# Patient Record
Sex: MALE | Race: WHITE | URBAN - METROPOLITAN AREA
[De-identification: names, ages, dates, MRNs, and addresses within clinical notes are randomized per-mention and may not be internally consistent; named-entity substitution may affect disease eponyms.]

---

## 2022-07-07 ENCOUNTER — WEB ENCOUNTER (OUTPATIENT)
Dept: URBAN - METROPOLITAN AREA CLINIC 72 | Facility: CLINIC | Age: 77
End: 2022-07-07

## 2022-07-13 ENCOUNTER — OFFICE VISIT (OUTPATIENT)
Dept: URBAN - METROPOLITAN AREA CLINIC 72 | Facility: CLINIC | Age: 77
End: 2022-07-13
Payer: MEDICARE

## 2022-07-13 ENCOUNTER — LAB OUTSIDE AN ENCOUNTER (OUTPATIENT)
Dept: URBAN - METROPOLITAN AREA CLINIC 72 | Facility: CLINIC | Age: 77
End: 2022-07-13

## 2022-07-13 VITALS
TEMPERATURE: 97.6 F | BODY MASS INDEX: 27.94 KG/M2 | HEART RATE: 80 BPM | DIASTOLIC BLOOD PRESSURE: 68 MMHG | HEIGHT: 70 IN | WEIGHT: 195.2 LBS | SYSTOLIC BLOOD PRESSURE: 139 MMHG

## 2022-07-13 DIAGNOSIS — Z98.890 STATUS POST LAPAROSCOPIC FUNDOPLICATION: ICD-10-CM

## 2022-07-13 DIAGNOSIS — K22.70 BARRETT'S ESOPHAGUS WITHOUT DYSPLASIA: ICD-10-CM

## 2022-07-13 DIAGNOSIS — K21.9 GASTROESOPHAGEAL REFLUX DISEASE, UNSPECIFIED WHETHER ESOPHAGITIS PRESENT: ICD-10-CM

## 2022-07-13 DIAGNOSIS — R13.19 ESOPHAGEAL DYSPHAGIA: ICD-10-CM

## 2022-07-13 PROCEDURE — 99204 OFFICE O/P NEW MOD 45 MIN: CPT | Performed by: INTERNAL MEDICINE

## 2022-07-13 RX ORDER — AMLODIPINE BESYLATE 10 MG/1
1 TABLET TABLET ORAL ONCE A DAY
Status: ACTIVE | COMMUNITY

## 2022-07-13 RX ORDER — ROSUVASTATIN CALCIUM 5 MG/1
1 TABLET TABLET, FILM COATED ORAL ONCE A DAY
Status: ACTIVE | COMMUNITY

## 2022-07-13 RX ORDER — BUPROPION HYDROCHLORIDE 150 MG/1
1 TABLET IN THE MORNING TABLET, FILM COATED ORAL ONCE A DAY
Status: ACTIVE | COMMUNITY

## 2022-07-13 RX ORDER — ASCORBIC ACID/ASCORBATE SODIUM 500 MG
1 TABLET TABLET,CHEWABLE ORAL ONCE A DAY
Status: ACTIVE | COMMUNITY

## 2022-07-13 RX ORDER — FLUOXETINE HYDROCHLORIDE 20 MG/1
1 CAPSULE CAPSULE ORAL ONCE A DAY
Status: ACTIVE | COMMUNITY

## 2022-07-13 RX ORDER — CHOLECALCIFEROL (VITAMIN D3) 50 MCG
1 CAPSULE CAPSULE ORAL ONCE A DAY
Status: ACTIVE | COMMUNITY

## 2022-07-13 RX ORDER — TIOTROPIUM BROMIDE AND OLODATEROL 3.124; 2.736 UG/1; UG/1
2 PUFFS SPRAY, METERED RESPIRATORY (INHALATION) ONCE A DAY
Status: ACTIVE | COMMUNITY

## 2022-07-13 RX ORDER — LISINOPRIL 40 MG/1
1 TABLET TABLET ORAL ONCE A DAY
Status: ACTIVE | COMMUNITY

## 2022-07-13 NOTE — HPI-TODAY'S VISIT:
Mr. Daily is a 76-year-old who presents as a new patient for consultation for Ivy's.  He has a history of GERD, Ivy's, status post cholecystectomy and hiatal hernia.  He was seen by [facility] in the past for evaluation for large hiatal hernia.  To the medical history includes hypertension, coronary artery disease status post stent in the 1990s, he is noted to be on Plavix in the past. He is no longer on ppi.   He underwent a da Agusto repair of a hiatal hernia at [facility] in 2021.His last upper endoscopy was prior to surgery.  He reports that he needs Ivy's surveillance in addition he is having difficulty swallowing and dysphagia.  He notes that he is no longer short of breath as he was prior to surgery but he does get some discomfort at times.  He notes that he has trouble swallowing mostly to solids happens intermittently.  Denies any heartburn.  He has required ablation of Ivy's in the past.

## 2022-07-21 PROBLEM — 40890009 ESOPHAGEAL DYSPHAGIA: Status: ACTIVE | Noted: 2022-07-21

## 2022-08-11 ENCOUNTER — OFFICE VISIT (OUTPATIENT)
Dept: URBAN - METROPOLITAN AREA MEDICAL CENTER 40 | Facility: MEDICAL CENTER | Age: 77
End: 2022-08-11
Payer: MEDICARE

## 2022-08-11 DIAGNOSIS — K44.9 DIAPHRAGMATIC HERNIA: ICD-10-CM

## 2022-08-11 DIAGNOSIS — K22.89 DILATATION OF ESOPHAGUS: ICD-10-CM

## 2022-08-11 DIAGNOSIS — K21.9 GASTROESOPHAGEAL REFLUX DISEASE, UNSPECIFIED WHETHER ESOPHAGITIS PRESENT: ICD-10-CM

## 2022-08-11 DIAGNOSIS — K22.70 BARRETT'S ESOPHAGUS WITHOUT DYSPLASIA: ICD-10-CM

## 2022-08-11 PROCEDURE — 43239 EGD BIOPSY SINGLE/MULTIPLE: CPT | Performed by: INTERNAL MEDICINE

## 2022-09-23 ENCOUNTER — DASHBOARD ENCOUNTERS (OUTPATIENT)
Age: 77
End: 2022-09-23

## 2022-09-23 ENCOUNTER — WEB ENCOUNTER (OUTPATIENT)
Dept: URBAN - METROPOLITAN AREA CLINIC 72 | Facility: CLINIC | Age: 77
End: 2022-09-23

## 2022-09-23 ENCOUNTER — OFFICE VISIT (OUTPATIENT)
Dept: URBAN - METROPOLITAN AREA CLINIC 72 | Facility: CLINIC | Age: 77
End: 2022-09-23
Payer: MEDICARE

## 2022-09-23 VITALS
SYSTOLIC BLOOD PRESSURE: 133 MMHG | HEIGHT: 70 IN | DIASTOLIC BLOOD PRESSURE: 62 MMHG | HEART RATE: 85 BPM | BODY MASS INDEX: 28.1 KG/M2 | TEMPERATURE: 97.1 F | WEIGHT: 196.3 LBS

## 2022-09-23 DIAGNOSIS — K22.70 BARRETT'S ESOPHAGUS WITHOUT DYSPLASIA: ICD-10-CM

## 2022-09-23 DIAGNOSIS — K21.9 GASTROESOPHAGEAL REFLUX DISEASE, UNSPECIFIED WHETHER ESOPHAGITIS PRESENT: ICD-10-CM

## 2022-09-23 DIAGNOSIS — Z98.890 STATUS POST LAPAROSCOPIC FUNDOPLICATION: ICD-10-CM

## 2022-09-23 DIAGNOSIS — K44.9 HIATAL HERNIA: ICD-10-CM

## 2022-09-23 PROBLEM — 128926000 POSTPROCEDURAL STATES: Status: ACTIVE | Noted: 2022-07-21

## 2022-09-23 PROBLEM — 302914006 BARRETT'S ESOPHAGUS: Status: ACTIVE | Noted: 2022-07-13

## 2022-09-23 PROBLEM — 235595009 GASTROESOPHAGEAL REFLUX DISEASE: Status: ACTIVE | Noted: 2022-07-13

## 2022-09-23 PROCEDURE — 99214 OFFICE O/P EST MOD 30 MIN: CPT | Performed by: INTERNAL MEDICINE

## 2022-09-23 RX ORDER — BUPROPION HYDROCHLORIDE 150 MG/1
1 TABLET IN THE MORNING TABLET, FILM COATED ORAL ONCE A DAY
Status: ACTIVE | COMMUNITY

## 2022-09-23 RX ORDER — FLUOXETINE HYDROCHLORIDE 20 MG/1
1 CAPSULE CAPSULE ORAL ONCE A DAY
Status: ACTIVE | COMMUNITY

## 2022-09-23 RX ORDER — ASCORBIC ACID/ASCORBATE SODIUM 500 MG
1 TABLET TABLET,CHEWABLE ORAL ONCE A DAY
Status: ACTIVE | COMMUNITY

## 2022-09-23 RX ORDER — CHOLECALCIFEROL (VITAMIN D3) 50 MCG
1 CAPSULE CAPSULE ORAL ONCE A DAY
Status: ACTIVE | COMMUNITY

## 2022-09-23 RX ORDER — AMLODIPINE BESYLATE 10 MG/1
1 TABLET TABLET ORAL ONCE A DAY
Status: ACTIVE | COMMUNITY

## 2022-09-23 RX ORDER — LISINOPRIL 40 MG/1
1 TABLET TABLET ORAL ONCE A DAY
Status: ACTIVE | COMMUNITY

## 2022-09-23 RX ORDER — ROSUVASTATIN CALCIUM 5 MG/1
1 TABLET TABLET, FILM COATED ORAL ONCE A DAY
Status: ACTIVE | COMMUNITY

## 2022-09-23 RX ORDER — TIOTROPIUM BROMIDE AND OLODATEROL 3.124; 2.736 UG/1; UG/1
2 PUFFS SPRAY, METERED RESPIRATORY (INHALATION) ONCE A DAY
Status: ACTIVE | COMMUNITY

## 2022-09-23 RX ORDER — PANTOPRAZOLE SODIUM 20 MG/1
1 TABLET TABLET, DELAYED RELEASE ORAL ONCE A DAY
Qty: 90 | Refills: 3 | OUTPATIENT
Start: 2022-09-23

## 2022-09-23 NOTE — HPI-TODAY'S VISIT:
Mr. Daily returns for follow-up, he is a 76-year-old male last seen in office on 7/13/2022, history of Ivy's, GERD and Nissen fundoplication done for large hiatal hernia.  We saw him he was having issues with intermittent dysphagia but no heartburn.  Given his symptoms he underwent EGD with dilation after a barium swallow.  Barium swallow done 7/27/2022 did show mild esophageal dysmotility and a small hiatal hernia with reflux.  There was no evidence of intrinsic or extrinsic compression.  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  There was a 4 cm sliding hiatal hernia which did obscure the exact starting point of the Ivy's mucosa as well as the GE junction.  Again the Ivy's only seem to circumferentially expand to 5 cm.  Biopsies at 45 cm suggested gastric type mucosa, 44 cm Ivy's esophagus, 43 cm no intestinal metaplasia, 42 cm Ivy's esophagus, 39 cm Ivy's esophagus, 38 cm Ivy's esophagus no dysplastic tissue appreciated.  Recommend repeat EGD in 3 years.  He is doing well with no issues with dysphagia.  He is not on any PPI at the moment.  They discontinued this because they felt he was having some issues with memory.  They note that he was on high-dose Nexium twice a day.

## 2023-09-18 ENCOUNTER — ERX REFILL RESPONSE (OUTPATIENT)
Dept: URBAN - METROPOLITAN AREA CLINIC 72 | Facility: CLINIC | Age: 78
End: 2023-09-18

## 2023-09-18 RX ORDER — PANTOPRAZOLE SODIUM 20 MG/1
1 TABLET TABLET, DELAYED RELEASE ORAL ONCE A DAY
Qty: 90 | Refills: 3 | OUTPATIENT

## 2023-09-18 RX ORDER — PANTOPRAZOLE 20 MG/1
TAKE ONE TABLET BY MOUTH DAILY TABLET, DELAYED RELEASE ORAL
Qty: 90 TABLET | Refills: 0 | OUTPATIENT

## 2023-12-19 ENCOUNTER — ERX REFILL RESPONSE (OUTPATIENT)
Dept: URBAN - METROPOLITAN AREA CLINIC 72 | Facility: CLINIC | Age: 78
End: 2023-12-19

## 2023-12-19 RX ORDER — PANTOPRAZOLE 20 MG/1
TAKE ONE TABLET BY MOUTH DAILY TABLET, DELAYED RELEASE ORAL
Qty: 90 TABLET | Refills: 0 | OUTPATIENT

## 2024-12-13 ENCOUNTER — ERX REFILL RESPONSE (OUTPATIENT)
Dept: URBAN - METROPOLITAN AREA CLINIC 72 | Facility: CLINIC | Age: 79
End: 2024-12-13

## 2024-12-13 RX ORDER — PANTOPRAZOLE 20 MG/1
TAKE ONE TABLET BY MOUTH DAILY TABLET, DELAYED RELEASE ORAL
Qty: 90 TABLET | Refills: 0 | OUTPATIENT

## 2024-12-17 ENCOUNTER — ERX REFILL RESPONSE (OUTPATIENT)
Dept: URBAN - METROPOLITAN AREA CLINIC 72 | Facility: CLINIC | Age: 79
End: 2024-12-17

## 2024-12-17 RX ORDER — PANTOPRAZOLE 20 MG/1
TAKE ONE TABLET BY MOUTH DAILY TABLET, DELAYED RELEASE ORAL
Qty: 90 TABLET | Refills: 0 | OUTPATIENT

## 2024-12-17 RX ORDER — PANTOPRAZOLE 20 MG/1
TAKE 1 TABLET BY MOUTH DAILY TABLET, DELAYED RELEASE ORAL
Qty: 90 TABLET | Refills: 0 | OUTPATIENT

## 2025-05-20 ENCOUNTER — OFFICE VISIT (OUTPATIENT)
Dept: URBAN - METROPOLITAN AREA CLINIC 72 | Facility: CLINIC | Age: 80
End: 2025-05-20

## 2025-05-22 ENCOUNTER — OFFICE VISIT (OUTPATIENT)
Dept: URBAN - METROPOLITAN AREA CLINIC 72 | Facility: CLINIC | Age: 80
End: 2025-05-22

## 2025-05-22 NOTE — HPI-TODAY'S VISIT:
Patient is an 79-year-old male last seen in the office on 9/23/2022 for a history of Ivy's esophagus, GERD he takes pantoprazole 20 mg daily.

## 2025-05-22 NOTE — HPI-OTHER HISTORIES
Procedures: 8/11/2022-EGD: Examination of the esophagus revealed Ivy's epithelium.  This was identified from 40 cm from the incisors to 35 cm from the incisors.  Affected area was not inflamed.  [place] classification: C5M6.  Biopsy every 1 to 2 cm.  Hiatal hernia obscured the exact start of the Ivy's epithelium it appeared to start somewhere between 40 to 45 cm and extended to 35 cm.  Sliding, 4 cm hiatal hernia.  Duodenum normal.  Path: Ivy's esophagus noted on biopsy at 44 cm, 42 cm, 39 cm, 38 cm.  Gastric type columnar mucosa noted at 45 cm, 43 cm.  Recall 3 years.  Due 8/2025.

## 2025-06-23 ENCOUNTER — TELEPHONE ENCOUNTER (OUTPATIENT)
Dept: URBAN - METROPOLITAN AREA CLINIC 72 | Facility: CLINIC | Age: 80
End: 2025-06-23

## 2025-06-24 ENCOUNTER — LAB OUTSIDE AN ENCOUNTER (OUTPATIENT)
Dept: URBAN - METROPOLITAN AREA CLINIC 72 | Facility: CLINIC | Age: 80
End: 2025-06-24

## 2025-06-24 PROBLEM — 68154008: Status: ACTIVE | Noted: 2025-06-24

## 2025-07-02 ENCOUNTER — LAB OUTSIDE AN ENCOUNTER (OUTPATIENT)
Dept: URBAN - METROPOLITAN AREA CLINIC 72 | Facility: CLINIC | Age: 80
End: 2025-07-02

## 2025-07-02 ENCOUNTER — OFFICE VISIT (OUTPATIENT)
Dept: URBAN - METROPOLITAN AREA CLINIC 72 | Facility: CLINIC | Age: 80
End: 2025-07-02
Payer: MEDICARE

## 2025-07-02 DIAGNOSIS — R13.19 ESOPHAGEAL DYSPHAGIA: ICD-10-CM

## 2025-07-02 DIAGNOSIS — K22.70 BARRETT'S ESOPHAGUS WITHOUT DYSPLASIA: ICD-10-CM

## 2025-07-02 PROBLEM — 71457002: Status: ACTIVE | Noted: 2025-07-02

## 2025-07-02 PROCEDURE — 99214 OFFICE O/P EST MOD 30 MIN: CPT

## 2025-07-02 RX ORDER — PANTOPRAZOLE 20 MG/1
TAKE 1 TABLET BY MOUTH DAILY TABLET, DELAYED RELEASE ORAL
Qty: 90 TABLET | Refills: 0 | Status: ACTIVE | COMMUNITY

## 2025-07-02 RX ORDER — EZETIMIBE 10 MG/1
1 TABLET TABLET ORAL ONCE A DAY
Status: ACTIVE | COMMUNITY

## 2025-07-02 RX ORDER — CHOLECALCIFEROL (VITAMIN D3) 50 MCG
1 CAPSULE CAPSULE ORAL ONCE A DAY
Status: ACTIVE | COMMUNITY

## 2025-07-02 RX ORDER — PANTOPRAZOLE SODIUM 20 MG/1
1 TABLET 1/2 TO 1 HOUR BEFORE MORNING MEAL TABLET, DELAYED RELEASE ORAL ONCE A DAY
Qty: 90 | Refills: 3 | OUTPATIENT
Start: 2025-07-02

## 2025-07-02 RX ORDER — ROSUVASTATIN 5 MG/1
1 TABLET TABLET, FILM COATED ORAL ONCE A DAY
Status: ACTIVE | COMMUNITY

## 2025-07-02 RX ORDER — AMLODIPINE BESYLATE 10 MG/1
1 TABLET TABLET ORAL ONCE A DAY
Status: ACTIVE | COMMUNITY

## 2025-07-02 RX ORDER — TIOTROPIUM BROMIDE AND OLODATEROL 3.124; 2.736 UG/1; UG/1
2 PUFFS SPRAY, METERED RESPIRATORY (INHALATION) ONCE A DAY
Status: ACTIVE | COMMUNITY

## 2025-07-02 RX ORDER — ASCORBIC ACID/ASCORBATE SODIUM 500 MG
1 TABLET TABLET,CHEWABLE ORAL ONCE A DAY
Status: ACTIVE | COMMUNITY

## 2025-07-02 RX ORDER — LISINOPRIL 40 MG/1
1 TABLET TABLET ORAL ONCE A DAY
Status: ACTIVE | COMMUNITY

## 2025-07-02 RX ORDER — FLUOXETINE HYDROCHLORIDE 20 MG/1
1 CAPSULE CAPSULE ORAL ONCE A DAY
Status: ACTIVE | COMMUNITY

## 2025-07-02 RX ORDER — BUPROPION HYDROCHLORIDE 150 MG/1
1 TABLET IN THE MORNING TABLET, FILM COATED ORAL ONCE A DAY
Status: ACTIVE | COMMUNITY

## 2025-07-02 NOTE — HPI-OTHER HISTORIES
Procedures: 8/11/2022-EGD: Ivy's epithelium identified from 40 cm to 35 cm from the incisors.  [place] classification: C5M6.  4 cm hiatal hernia.  Normal duodenum.  Path: Esophageal 45 cm biopsy showed gastric type columnar mucosa negative for intestinal metaplasia and dysplasia.  44 cm biopsy showed Ivy's esophagus negative for dysplasia.  43 cm showed gastric type: Mucosa negative for intestinal metaplasia and dysplasia.  42 cm showed Ivy's esophagus negative for dysplasia.  39 cm Ivy's esophagus negative for dysplasia.  38 cm Ivy's esophagus negative for dysplasia.  Patient needs 3-year recall.  Due 8/2025.  DI: 6/30/2025-modified barium esophagram-negative for aspiration.  SLP evaluation positive for penetration.  Negative for aspiration.  Patient should be on a regular diet with thin liquids with strict use of aspiration precautions and compensatory swallowing strategies including small bites, small sips, single sips, upright positioning, cued double swallow, slow rate of feeding, and reducing external distractions during meals.  Recommends outpatient surgeries for laryngeal and pharyngeal strengthening.

## 2025-07-02 NOTE — HPI-TODAY'S VISIT:
Patient is an 79-year-old male last seen in the office on 9/23/2022 for EGD follow-up secondary to GERD, hiatal hernia, and Ivy's.  Patient has been on pantoprazole 20 mg daily.  His last EGD was performed 8/11/2022. Patient's wife called on 6/23/2025 noting that patient was having issues with swallowing.  Modified barium swallow and esophagram were ordered.

## 2025-07-28 ENCOUNTER — WEB ENCOUNTER (OUTPATIENT)
Dept: URBAN - METROPOLITAN AREA CLINIC 72 | Facility: CLINIC | Age: 80
End: 2025-07-28

## 2025-08-04 ENCOUNTER — OFFICE VISIT (OUTPATIENT)
Dept: URBAN - METROPOLITAN AREA CLINIC 72 | Facility: CLINIC | Age: 80
End: 2025-08-04